# Patient Record
Sex: MALE | Race: AMERICAN INDIAN OR ALASKA NATIVE
[De-identification: names, ages, dates, MRNs, and addresses within clinical notes are randomized per-mention and may not be internally consistent; named-entity substitution may affect disease eponyms.]

---

## 2022-01-16 ENCOUNTER — HOSPITAL ENCOUNTER (EMERGENCY)
Dept: HOSPITAL 5 - ED | Age: 67
Discharge: HOME | End: 2022-01-16
Payer: MEDICARE

## 2022-01-16 VITALS — SYSTOLIC BLOOD PRESSURE: 148 MMHG | DIASTOLIC BLOOD PRESSURE: 91 MMHG

## 2022-01-16 DIAGNOSIS — I50.9: ICD-10-CM

## 2022-01-16 DIAGNOSIS — I11.0: ICD-10-CM

## 2022-01-16 DIAGNOSIS — J44.1: ICD-10-CM

## 2022-01-16 DIAGNOSIS — R09.02: Primary | ICD-10-CM

## 2022-01-16 DIAGNOSIS — Z79.82: ICD-10-CM

## 2022-01-16 DIAGNOSIS — F17.200: ICD-10-CM

## 2022-01-16 DIAGNOSIS — Z20.822: ICD-10-CM

## 2022-01-16 DIAGNOSIS — Z79.899: ICD-10-CM

## 2022-01-16 DIAGNOSIS — I63.9: ICD-10-CM

## 2022-01-16 DIAGNOSIS — I42.8: ICD-10-CM

## 2022-01-16 LAB
ANISOCYTOSIS BLD QL SMEAR: (no result)
BAND NEUTROPHILS # (MANUAL): 0 K/MM3
BUN SERPL-MCNC: 16 MG/DL (ref 9–20)
BUN/CREAT SERPL: 13 %
CALCIUM SERPL-MCNC: 9.2 MG/DL (ref 8.4–10.2)
HCT VFR BLD CALC: 40.7 % (ref 35.5–45.6)
HEMOLYSIS INDEX: 5
HGB BLD-MCNC: 12.6 GM/DL (ref 11.8–15.2)
INR PPP: 0.89 (ref 0.87–1.13)
MCHC RBC AUTO-ENTMCNC: 31 % (ref 32–34)
MCV RBC AUTO: 83 FL (ref 84–94)
MYELOCYTES # (MANUAL): 0 K/MM3
PLATELET # BLD: 195 K/MM3 (ref 140–440)
PROMYELOCYTES # (MANUAL): 0 K/MM3
RBC # BLD AUTO: 4.89 M/MM3 (ref 3.65–5.03)
TOTAL CELLS COUNTED BLD: 100

## 2022-01-16 PROCEDURE — 94640 AIRWAY INHALATION TREATMENT: CPT

## 2022-01-16 PROCEDURE — 96361 HYDRATE IV INFUSION ADD-ON: CPT

## 2022-01-16 PROCEDURE — 99285 EMERGENCY DEPT VISIT HI MDM: CPT

## 2022-01-16 PROCEDURE — 36415 COLL VENOUS BLD VENIPUNCTURE: CPT

## 2022-01-16 PROCEDURE — 71045 X-RAY EXAM CHEST 1 VIEW: CPT

## 2022-01-16 PROCEDURE — 80048 BASIC METABOLIC PNL TOTAL CA: CPT

## 2022-01-16 PROCEDURE — 85610 PROTHROMBIN TIME: CPT

## 2022-01-16 PROCEDURE — 85007 BL SMEAR W/DIFF WBC COUNT: CPT

## 2022-01-16 PROCEDURE — 99284 EMERGENCY DEPT VISIT MOD MDM: CPT

## 2022-01-16 PROCEDURE — 85025 COMPLETE CBC W/AUTO DIFF WBC: CPT

## 2022-01-16 PROCEDURE — U0003 INFECTIOUS AGENT DETECTION BY NUCLEIC ACID (DNA OR RNA); SEVERE ACUTE RESPIRATORY SYNDROME CORONAVIRUS 2 (SARS-COV-2) (CORONAVIRUS DISEASE [COVID-19]), AMPLIFIED PROBE TECHNIQUE, MAKING USE OF HIGH THROUGHPUT TECHNOLOGIES AS DESCRIBED BY CMS-2020-01-R: HCPCS

## 2022-01-16 PROCEDURE — 96374 THER/PROPH/DIAG INJ IV PUSH: CPT

## 2022-01-16 PROCEDURE — 94644 CONT INHLJ TX 1ST HOUR: CPT

## 2022-01-16 RX ADMIN — IPRATROPIUM BROMIDE AND ALBUTEROL SULFATE SCH AMPUL: .5; 3 SOLUTION RESPIRATORY (INHALATION) at 10:01

## 2022-01-16 RX ADMIN — IPRATROPIUM BROMIDE AND ALBUTEROL SULFATE SCH AMPUL: .5; 3 SOLUTION RESPIRATORY (INHALATION) at 14:33

## 2022-01-16 NOTE — EMERGENCY DEPARTMENT REPORT
ED Shortness of Breath HPI





- General


Stated Complaint: COPD EXACERBATION


Time Seen by Provider: 01/16/22 02:57


Source: patient, EMS


Mode of arrival: Stretcher


Limitations: No Limitations





- History of Present Illness


Initial Comments: 





66-year-old male with a past medical history of COPD (no home oxygen), CHF, and 

TIA without residual deficits presents to the hospital complains of sudden onset

of shortness of breath that started upon waking this morning.  Patient states he

went to bed asymptomatic woke up with significant wheezing and shortness of 

breath.  Patient tried a home nebulizer treatment without significant 

improvement therefore called EMS.  EMS reports that her O2 saturation in the 

high 80s upon their arrival.  He received albuterol 5 mg and Solu-Medrol 125 mg 

with improvement in symptoms.  Patient states he is feeling much better.  He 

denies pain, cough, fever, orthopnea, PND, leg edema, calf tenderness, or 

previous intubations room air saturation currently 94%.  He states he has been 

vaccinated for COVID





- Related Data


                                Home Medications











 Medication  Instructions  Recorded  Confirmed  Last Taken


 


Aspirin 325 mg PO QDAY 11/07/13 02/17/14 02/17/14 07:30


 


Furosemide [Furosemide ORAL LIQ] 20 mg PO BID 11/07/13 02/17/14 02/17/14 07:30


 


Nitroglycerin [Nitrostat] 0.4 mg SL Q5M PRN 11/07/13 02/17/14 Unknown


 


Simvastatin 20 mg PO QDAY 11/07/13 02/17/14 02/16/14 20:00


 


carvediloL [Coreg] 3.125 mg PO BID 11/07/13 02/17/14 02/17/14 07:30


 


lisinopriL [Zestril TAB] 5 mg PO QDAY 11/07/13 02/17/14 02/17/14 07:30








                                  Previous Rx's











 Medication  Instructions  Recorded  Last Taken  Type


 


Hydrocodone Bit/Acetaminophen 1 each PO Q6H PRN #10 tablet 11/07/13 Unknown Rx





[Lortab 5-500 Tablet]    


 


Warfarin [Coumadin] 7.5 mg PO QDAY #30 11/10/13 02/17/14 07:30 Rx


 


Ciprofloxacin 0.2%(Nf) 0.25 ml OTIC BID #5 ml 02/17/14 Unknown Rx





[Ciprofloxacin HCl 0.2%]    


 


Ciprofloxacin HCl [Cipro] 500 mg PO Q12H #14 tab 04/28/14 Unknown Rx











                                    Allergies











Allergy/AdvReac Type Severity Reaction Status Date / Time


 


No Known Allergies Allergy   Verified 01/16/22 03:00














ED Review of Systems


ROS: 


Stated complaint: COPD EXACERBATION


Other details as noted in HPI





Comment: All other systems reviewed and negative





ED Past Medical Hx





- Past Medical History


Hx Hypertension: Yes


Hx CVA: Yes


Hx Congestive Heart Failure: Yes (Non-Ischemic cardiomyopathy, EF 15%, s/p AICD)


Hx Diabetes: No


Hx Deep Vein Thrombosis: No


Hx Pulmonary Embolism: No


Hx Liver Disease: No


Hx Renal Disease: No


Hx Sickle Cell Disease: No


Hx Arthritis: No


Hx Seizures: No


Hx Kidney Stones: No


Hx Asthma: No


Hx COPD: No


Hx Tuberculosis: No


Hx Dementia: No


Hx HIV: No


Additional medical history: TIA





- Surgical History


Hx Coronary Stent: No


Hx Open Heart Surgery: No


Hx Pacemaker: No


Hx Internal Defibrillator: Yes


Hx Cholecystectomy: No


Hx Appendectomy: No


Hx Breast Surgery: No





- Social History


Smoking Status: Current Every Day Smoker


Substance Use Type: Alcohol





- Medications


Home Medications: 


                                Home Medications











 Medication  Instructions  Recorded  Confirmed  Last Taken  Type


 


Aspirin 325 mg PO QDAY 11/07/13 02/17/14 02/17/14 07:30 History


 


Furosemide [Furosemide ORAL LIQ] 20 mg PO BID 11/07/13 02/17/14 02/17/14 07:30 

History


 


Hydrocodone Bit/Acetaminophen 1 each PO Q6H PRN #10 tablet 11/07/13 02/17/14 

Unknown Rx





[Lortab 5-500 Tablet]     


 


Nitroglycerin [Nitrostat] 0.4 mg SL Q5M PRN 11/07/13 02/17/14 Unknown History


 


Simvastatin 20 mg PO QDAY 11/07/13 02/17/14 02/16/14 20:00 History


 


carvediloL [Coreg] 3.125 mg PO BID 11/07/13 02/17/14 02/17/14 07:30 History


 


lisinopriL [Zestril TAB] 5 mg PO QDAY 11/07/13 02/17/14 02/17/14 07:30 History


 


Warfarin [Coumadin] 7.5 mg PO QDAY #30 11/10/13 02/17/14 02/17/14 07:30 Rx


 


Ciprofloxacin 0.2%(Nf) 0.25 ml OTIC BID #5 ml 02/17/14  Unknown Rx





[Ciprofloxacin HCl 0.2%]     


 


Ciprofloxacin HCl [Cipro] 500 mg PO Q12H #14 tab 04/28/14  Unknown Rx














ED Physical Exam





- Other


Other exam information: 





General: No acute distress


Head: Atraumatic


Eyes: normal appearance


ENT: Moist mucous membranes


Neck: Normal appearance, no midline tenderness


Chest: Clear to auscultation bilaterally


CV: Regular rate and rhythm


Abdomen: Soft, normal bowel sounds, nontender, nondistended, no rebound or 

guarding


Back: Normal inspection


Extremity: Normal inspection, full range of motion, no calf tenderness or edema


Neuro: Alert O x 3, no facial asymmetry, speech clear, no gross motor sensory 

deficit


Psych: Appropriate behavior


Skin: No rash





ED Course


                                   Vital Signs











  01/16/22 01/16/22 01/16/22





  03:00 03:05 03:15


 


Temperature 98.9 F 98.1 F 


 


Pulse Rate 86 81 95 H


 


Pulse Rate [   





Bilateral   





Throughout]   


 


Respiratory 20 19 14





Rate   


 


Respiratory   





Rate [Bilateral   





Throughout]   


 


Blood Pressure   139/85


 


Blood Pressure 150/96 141/86 





[Left]   


 


O2 Sat by Pulse 91 100 98





Oximetry   














  01/16/22 01/16/22 01/16/22





  03:31 03:41 03:45


 


Temperature   


 


Pulse Rate 75  87


 


Pulse Rate [  76 





Bilateral   





Throughout]   


 


Respiratory 17  20





Rate   


 


Respiratory  22 





Rate [Bilateral   





Throughout]   


 


Blood Pressure 144/85  123/90


 


Blood Pressure   





[Left]   


 


O2 Sat by Pulse 100  97





Oximetry   














  01/16/22 01/16/22





  04:01 04:15


 


Temperature  


 


Pulse Rate 85 83


 


Pulse Rate [  





Bilateral  





Throughout]  


 


Respiratory 19 19





Rate  


 


Respiratory  





Rate [Bilateral  





Throughout]  


 


Blood Pressure 131/94 139/88


 


Blood Pressure  





[Left]  


 


O2 Sat by Pulse 96 96





Oximetry  














- Reevaluation(s)


Reevaluation #1: 





01/16/22 05:16


Patient initially stated he felt better after additional DuoNeb treatment here 

in the ED however, he began to feel short of breath again.  Patient currently 

has bilateral scattered wheezing with a room air saturation of 90%.  Additional 

nebs ordered.  Patient will be admitted to the hospital





ED Medical Decision Making





- Lab Data


Result diagrams: 


                                 01/16/22 03:12





                                 01/16/22 03:12








                                   Lab Results











  01/16/22 01/16/22 Range/Units





  03:12 03:12 


 


WBC  6.1   (4.5-11.0)  K/mm3


 


RBC  4.89   (3.65-5.03)  M/mm3


 


Hgb  12.6   (11.8-15.2)  gm/dl


 


Hct  40.7   (35.5-45.6)  %


 


MCV  83 L   (84-94)  fl


 


MCH  26 L   (28-32)  pg


 


MCHC  31 L   (32-34)  %


 


RDW  15.7 H   (13.2-15.2)  %


 


Plt Count  195   (140-440)  K/mm3


 


Baso % (Auto)  Np   


 


Add Manual Diff  Complete   


 


Total Counted  100   


 


Seg Neuts % (Manual)  56.0   (40.0-70.0)  %


 


Band Neutrophils %  0   %


 


Lymphocytes % (Manual)  21.0   (13.4-35.0)  %


 


Reactive Lymphs % (Man)  0   %


 


Monocytes % (Manual)  14.0 H   (0.0-7.3)  %


 


Eosinophils % (Manual)  9.0 H   (0.0-4.3)  %


 


Basophils % (Manual)  0   (0.0-1.8)  %


 


Metamyelocytes %  0   %


 


Myelocytes %  0   %


 


Promyelocytes %  0   %


 


Blast Cells %  0   %


 


Nucleated RBC %  Not Reportable   


 


Seg Neutrophils # Man  3.4   (1.8-7.7)  K/mm3


 


Band Neutrophils #  0.0   K/mm3


 


Lymphocytes # (Manual)  1.3   (1.2-5.4)  K/mm3


 


Abs React Lymphs (Man)  0.0   K/mm3


 


Monocytes # (Manual)  0.9 H   (0.0-0.8)  K/mm3


 


Eosinophils # (Manual)  0.5 H   (0.0-0.4)  K/mm3


 


Basophils # (Manual)  0.0   (0.0-0.1)  K/mm3


 


Metamyelocytes #  0.0   K/mm3


 


Myelocytes #  0.0   K/mm3


 


Promyelocytes #  0.0   K/mm3


 


Blast Cells #  0.0   K/mm3


 


WBC Morphology  Not Reportable   


 


Hypersegmented Neuts  Not Reportable   


 


Hyposegmented Neuts  Not Reportable   


 


Hypogranular Neuts  Not Reportable   


 


Smudge Cells  Not Reportable   


 


Toxic Granulation  Not Reportable   


 


Toxic Vacuolation  Not Reportable   


 


Dohle Bodies  Not Reportable   


 


Pelger-Huet Anomaly  Not Reportable   


 


Russ Rods  Not Reportable   


 


Platelet Estimate  Consistent w auto   


 


Clumped Platelets  Not Reportable   


 


Plt Clumps, EDTA  Not Reportable   


 


Large Platelets  Not Reportable   


 


Giant Platelets  Not Reportable   


 


Platelet Satelliting  Not Reportable   


 


Plt Morphology Comment  Not Reportable   


 


RBC Morphology  Not Reportable   


 


Dimorphic RBCs  Not Reportable   


 


Polychromasia  Not Reportable   


 


Hypochromasia  Not Reportable   


 


Poikilocytosis  Not Reportable   


 


Anisocytosis  1+   


 


Microcytosis  Not Reportable   


 


Macrocytosis  Not Reportable   


 


Spherocytes  Not Reportable   


 


Pappenheimer Bodies  Not Reportable   


 


Sickle Cells  Not Reportable   


 


Target Cells  Not Reportable   


 


Tear Drop Cells  Not Reportable   


 


Ovalocytes  Not Reportable   


 


Helmet Cells  Not Reportable   


 


Kasper-Carson Bodies  Not Reportable   


 


Cabot Rings  Not Reportable   


 


Oneyda Cells  Not Reportable   


 


Bite Cells  Not Reportable   


 


Crenated Cell  Not Reportable   


 


Elliptocytes  Not Reportable   


 


Acanthocytes (Spur)  Not Reportable   


 


Rouleaux  Not Reportable   


 


Hemoglobin C Crystals  Not Reportable   


 


Schistocytes  Not Reportable   


 


Malaria parasites  Not Reportable   


 


Sreekanth Bodies  Not Reportable   


 


Hem Pathologist Commnt  No   


 


Sodium   140  (137-145)  mmol/L


 


Potassium   4.2  (3.6-5.0)  mmol/L


 


Chloride   102.3  ()  mmol/L


 


Carbon Dioxide   29  (22-30)  mmol/L


 


Anion Gap   13  mmol/L


 


BUN   16  (9-20)  mg/dL


 


Creatinine   1.2  (0.8-1.3)  mg/dL


 


Estimated GFR   > 60  ml/min


 


BUN/Creatinine Ratio   13  %


 


Glucose   140 H  ()  mg/dL


 


Calcium   9.2  (8.4-10.2)  mg/dL














- Radiology Data


Radiology results: report reviewed





CHEST 1 VIEW 1/16/2022 3:14 AM  


 


 INDICATION / CLINICAL INFORMATION: sob, copd.  


 


 COMPARISON: 11/07/13  


 


 FINDINGS:  


 


 SUPPORT DEVICES: None.  


 HEART / MEDIASTINUM: Heart is normal size and stable. Left-sided single lead 

AICD is unchanged.   


 LUNGS / PLEURA: No significant pulmonary or pleural abnormality. No 

pneumothorax.   


 


 ADDITIONAL FINDINGS: No significant additional findings.  


 


 IMPRESSION:  


 1. No acute findings.  


 








- Medical Decision Making





66-year-old male presents to the hospital for COPD exacerbation who had 

initially improved after bronchodilators from EMS and upon initial ED arrival.  

Patient also received steroids in route.  Patient having recurrent wheezing and 

hypoxia despite ED treatment and therefore will be admitted for further 

treatment.  Chest x-ray labs unremarkable.


Critical Care Time: No


Critical care attestation.: 


If time is entered above; I have spent that time in minutes in the direct care 

of this critically ill patient, excluding procedure time.








ED Disposition


Clinical Impression: 


 COPD exacerbation, Hypoxia





Disposition: 09 ADMITTED AS INPATIENT


Is pt being admited?: Yes


Does the pt Need Aspirin: No


Condition: Stable


Instructions:  Chronic Obstructive Pulmonary Disease (ED)


Time of Disposition: 05:16 (Dr hamilton/hospitalist)

## 2022-01-16 NOTE — DISCHARGE SUMMARY
Providers





- Providers


Date of Admission: 


01/16/22 05:35





Date of discharge: 01/16/22


Attending physician: 


JENNI DAS MD





Primary care physician: 


PRIMARY CARE MD








Hospitalization


Reason for admission: Acute on chronic COPD exacerbation


Condition: Stable


Pertinent studies: 





Reviewed.


Procedures: 





None.


Hospital course: 





The patient is a 66-year-old male past medical history of COPD (not requiring 

home oxygen), congestive heart failure, hypertension, and history of TIA without

residual defects who presented with acute onset of shortness of breath with 

wheezing that did not resolve after attempting a home nebulizer treatment.  The 

patient called EMS for transport to the ED.  The patient was saturating in the 

high 80s upon arrival.  The patient received albuterol 5 mg and Solu-Medrol 125 

mg with significant improvement in symptoms.  The patient has been vaccinated 

for COVID-19 (Pfizer x2; not yet ready for his booster).  Upon examination the 

patient was found to be saturating at 95% on room air.  The patient was also 

negative for wheezing with auscultation.  The patient endorses feeling 

significantly better after receiving albuterol treatments.  The patient denied 

any fevers, chills, productive cough, leg swelling, recent travel, altered 

mentation, confusion, chest pain, or decreased urination.  The patient will be 

discharged with a 5-day course of Levaquin.  The patient has been counseled on 

completing his antibiotic dose and being adherent to his home medications.  The 

patient expresses understanding.  The patient will follow-up with his primary 

care provider.  The patient is medically clear for discharge.


Disposition: 01 HOME / SELF CARE / HOMELESS


Final Discharge Diagnosis (Prints w/discharge instructions): Acute on chronic 

COPD exacerbation, congestive heart failure, hypertension


Time spent for discharge: 45 min





Core Measure Documentation





- Palliative Care


Palliative Care/ Comfort Measures: Not Applicable





- Core Measures


Any of the following diagnoses?: none





Exam





- Constitutional


Vitals: 


                                        











Temp Pulse Resp BP Pulse Ox


 


 98.1 F   81   18   133/91   94 


 


 01/16/22 03:05  01/16/22 10:02  01/16/22 10:02  01/16/22 06:15  01/16/22 06:15











General appearance: Present: no acute distress, well-nourished





- EENT


Eyes: Present: PERRL, EOM intact


ENT: hearing intact, clear oral mucosa, poor dentition





- Neck


Neck: Present: supple, normal ROM





- Respiratory


Respiratory effort: normal


Respiratory: bilateral: CTA, negative: wheezing





- Cardiovascular


Rhythm: regular


Heart Sounds: Present: S1 & S2





- Extremities


Extremities: no ischemia, pulses intact, pulses symmetrical, No edema, normal 

temperature, normal color, Full ROM


Peripheral Pulses: within normal limits





- Abdominal


General gastrointestinal: Present: soft, non-tender, non-distended, normal bowel

sounds


Male genitourinary: Present: deferred





- Rectal


Rectal Exam: deferred





- Integumentary


Integumentary: Present: clear, warm, dry





- Musculoskeletal


Musculoskeletal: strength equal bilaterally





- Psychiatric


Psychiatric: appropriate mood/affect, intact judgment & insight, memory intact, 

cooperative





- Neurologic


Neurologic: CNII-XII intact, moves all extremities





- Allied Health


Allied health notes reviewed: nursing





Plan


Activity: no restrictions


Diet: low salt


Additional Instructions: The patient is a 66-year-old male past medical history 

of COPD (not requiring home oxygen), congestive heart failure, hypertension, and

history of TIA without residual defects who presented with acute onset of 

shortness of breath with wheezing that did not resolve after attempting a home 

nebulizer treatment.  The patient called EMS for transport to the ED.  The 

patient was saturating in the high 80s upon arrival.  The patient received 

albuterol 5 mg and Solu-Medrol 125 mg with significant improvement in symptoms. 

The patient has been vaccinated for COVID-19 (Pfizer x2; not yet ready for his 

booster).  Upon examination the patient was found to be saturating at 95% on 

room air.  The patient was also negative for wheezing with auscultation.  The 

patient endorses feeling significantly better after receiving albuterol 

treatments.  The patient denied any fevers, chills, productive cough, leg 

swelling, recent travel, altered mentation, confusion, chest pain, or decreased 

urination.  The patient will be discharged with a 5-day course of Levaquin.  The

patient has been counseled on completing his antibiotic dose and being adherent 

to his home medications.  The patient expresses understanding.  The patient will

follow-up with his primary care provider.  The patient is medically clear for 

discharge.


Care Plan Goals: 


Patient is medically clear for discharge.


Assessment: 


The patient is a 66-year-old male past medical history of COPD (not requiring 

home oxygen), congestive heart failure, hypertension, and history of TIA without

residual defects who presented with acute onset of shortness of breath with 

wheezing that did not resolve after attempting a home nebulizer treatment.  The 

patient called EMS for transport to the ED.  The patient was saturating in the 

high 80s upon arrival.  The patient received albuterol 5 mg and Solu-Medrol 125 

mg with significant improvement in symptoms.  The patient has been vaccinated 

for COVID-19 (Pfizer x2; not yet ready for his booster).  Upon examination the 

patient was found to be saturating at 95% on room air.  The patient was also 

negative for wheezing with auscultation.  The patient endorses feeling 

significantly better after receiving albuterol treatments.  The patient denied 

any fevers, chills, productive cough, leg swelling, recent travel, altered 

mentation, confusion, chest pain, or decreased urination.  The patient will be 

discharged with a 5-day course of Levaquin.  The patient has been counseled on 

completing his antibiotic dose and being adherent to his home medications.  The 

patient expresses understanding.  The patient will follow-up with his primary 

care provider.  The patient is medically clear for discharge.


Follow up with: 


PRIMARY CARE,MD [Primary Care Provider] - 3-5 Days


Forms:  Warfarin Discharge Instruction


Prescriptions: 


predniSONE [Deltasone] 20 mg PO QDAY #5 tab


levoFLOXacin [Levaquin] 750 mg PO QDAY #5 tablet

## 2022-01-16 NOTE — XRAY REPORT
CHEST 1 VIEW 1/16/2022 3:14 AM



INDICATION / CLINICAL INFORMATION: sob, copd.



COMPARISON: 11/07/13



FINDINGS:



SUPPORT DEVICES: None.

HEART / MEDIASTINUM: Heart is normal size and stable. Left-sided single lead AICD is unchanged. 

LUNGS / PLEURA: No significant pulmonary or pleural abnormality. No pneumothorax. 



ADDITIONAL FINDINGS: No significant additional findings.



IMPRESSION:

1. No acute findings.



Signer Name: NABEEL Alvarez MD 

Signed: 1/16/2022 3:28 AM

Workstation Name: [id number]